# Patient Record
Sex: FEMALE | Race: WHITE
[De-identification: names, ages, dates, MRNs, and addresses within clinical notes are randomized per-mention and may not be internally consistent; named-entity substitution may affect disease eponyms.]

---

## 2017-01-27 ENCOUNTER — HOSPITAL ENCOUNTER (OUTPATIENT)
Dept: HOSPITAL 53 - M SFHCCLAY | Age: 82
End: 2017-01-27
Attending: FAMILY MEDICINE
Payer: MEDICARE

## 2017-01-27 DIAGNOSIS — E11.21: ICD-10-CM

## 2017-01-27 DIAGNOSIS — E78.4: Primary | ICD-10-CM

## 2017-01-27 LAB — EST. AVERAGE GLUCOSE BLD GHB EST-MCNC: 123 MG/DL (ref 60–110)

## 2017-07-06 ENCOUNTER — HOSPITAL ENCOUNTER (OUTPATIENT)
Dept: HOSPITAL 53 - M LAB REF | Age: 82
End: 2017-07-06
Attending: PHYSICIAN ASSISTANT
Payer: MEDICARE

## 2017-07-06 DIAGNOSIS — H65.01: Primary | ICD-10-CM

## 2017-08-10 ENCOUNTER — HOSPITAL ENCOUNTER (OUTPATIENT)
Dept: HOSPITAL 53 - M SFHCCLAY | Age: 82
End: 2017-08-10
Attending: FAMILY MEDICINE
Payer: MEDICARE

## 2017-08-10 DIAGNOSIS — R25.2: ICD-10-CM

## 2017-08-10 DIAGNOSIS — I10: ICD-10-CM

## 2017-08-10 DIAGNOSIS — E78.4: Primary | ICD-10-CM

## 2017-08-10 DIAGNOSIS — E11.21: ICD-10-CM

## 2017-08-10 LAB
ALBUMIN SERPL BCG-MCNC: 3.8 GM/DL (ref 3.2–5.2)
ALBUMIN/GLOB SERPL: 1.12 {RATIO} (ref 1–1.93)
ALP SERPL-CCNC: 32 U/L (ref 45–117)
ALT SERPL W P-5'-P-CCNC: 23 U/L (ref 12–78)
ANION GAP SERPL CALC-SCNC: 10 MEQ/L (ref 8–16)
AST SERPL-CCNC: 20 U/L (ref 15–37)
BILIRUB SERPL-MCNC: 0.3 MG/DL (ref 0.2–1)
BUN SERPL-MCNC: 46 MG/DL (ref 7–18)
CALCIUM SERPL-MCNC: 9.4 MG/DL (ref 8.8–10.2)
CHLORIDE SERPL-SCNC: 110 MEQ/L (ref 98–107)
CO2 SERPL-SCNC: 22 MEQ/L (ref 21–32)
CREAT SERPL-MCNC: 1.34 MG/DL (ref 0.55–1.02)
EST. AVERAGE GLUCOSE BLD GHB EST-MCNC: 137 MG/DL (ref 60–110)
GFR SERPL CREATININE-BSD FRML MDRD: 39.8 ML/MIN/{1.73_M2} (ref 32–?)
GLUCOSE SERPL-MCNC: 109 MG/DL (ref 83–110)
MAGNESIUM SERPL-MCNC: 2.2 MG/DL (ref 1.8–2.4)
POTASSIUM SERPL-SCNC: 5.4 MEQ/L (ref 3.5–5.1)
PROT SERPL-MCNC: 7.2 GM/DL (ref 6.4–8.2)
SODIUM SERPL-SCNC: 142 MEQ/L (ref 136–145)

## 2017-08-14 ENCOUNTER — HOSPITAL ENCOUNTER (OUTPATIENT)
Dept: HOSPITAL 53 - M SFHCCLAY | Age: 82
End: 2017-08-14
Attending: FAMILY MEDICINE
Payer: MEDICARE

## 2017-08-14 DIAGNOSIS — E78.4: ICD-10-CM

## 2017-08-14 DIAGNOSIS — R25.2: Primary | ICD-10-CM

## 2017-08-14 DIAGNOSIS — I10: ICD-10-CM

## 2017-12-06 ENCOUNTER — HOSPITAL ENCOUNTER (OUTPATIENT)
Dept: HOSPITAL 53 - M SFHCCLAY | Age: 82
End: 2017-12-06
Attending: FAMILY MEDICINE
Payer: MEDICARE

## 2017-12-06 DIAGNOSIS — E11.21: Primary | ICD-10-CM

## 2017-12-06 LAB
ALBUMIN SERPL BCG-MCNC: 4 GM/DL (ref 3.2–5.2)
ANION GAP SERPL CALC-SCNC: 10 MEQ/L (ref 8–16)
BUN SERPL-MCNC: 46 MG/DL (ref 7–18)
CALCIUM SERPL-MCNC: 10.1 MG/DL (ref 8.8–10.2)
CHLORIDE SERPL-SCNC: 104 MEQ/L (ref 98–107)
CO2 SERPL-SCNC: 28 MEQ/L (ref 21–32)
CREAT SERPL-MCNC: 1.63 MG/DL (ref 0.55–1.02)
GFR SERPL CREATININE-BSD FRML MDRD: 31.7 ML/MIN/{1.73_M2} (ref 32–?)
GLUCOSE SERPL-MCNC: 113 MG/DL (ref 83–110)
PHOSPHATE SERPL-MCNC: 3.6 MG/DL (ref 2.5–4.9)
POTASSIUM SERPL-SCNC: 4.7 MEQ/L (ref 3.5–5.1)
SODIUM SERPL-SCNC: 142 MEQ/L (ref 136–145)

## 2017-12-06 PROCEDURE — 80069 RENAL FUNCTION PANEL: CPT

## 2017-12-20 ENCOUNTER — HOSPITAL ENCOUNTER (OUTPATIENT)
Dept: HOSPITAL 53 - M SFHCCLAY | Age: 82
End: 2017-12-20
Attending: FAMILY MEDICINE
Payer: MEDICARE

## 2017-12-20 DIAGNOSIS — E11.21: Primary | ICD-10-CM

## 2017-12-20 LAB
ALBUMIN SERPL BCG-MCNC: 3.9 GM/DL (ref 3.2–5.2)
ANION GAP SERPL CALC-SCNC: 7 MEQ/L (ref 8–16)
BUN SERPL-MCNC: 50 MG/DL (ref 7–18)
CALCIUM SERPL-MCNC: 9.5 MG/DL (ref 8.8–10.2)
CHLORIDE SERPL-SCNC: 106 MEQ/L (ref 98–107)
CO2 SERPL-SCNC: 28 MEQ/L (ref 21–32)
CREAT SERPL-MCNC: 1.88 MG/DL (ref 0.55–1.02)
GFR SERPL CREATININE-BSD FRML MDRD: 26.9 ML/MIN/{1.73_M2} (ref 32–?)
GLUCOSE SERPL-MCNC: 114 MG/DL (ref 83–110)
PHOSPHATE SERPL-MCNC: 3.3 MG/DL (ref 2.5–4.9)
POTASSIUM SERPL-SCNC: 4.8 MEQ/L (ref 3.5–5.1)
SODIUM SERPL-SCNC: 141 MEQ/L (ref 136–145)

## 2018-01-12 ENCOUNTER — HOSPITAL ENCOUNTER (OUTPATIENT)
Dept: HOSPITAL 53 - M LAB REF | Age: 83
End: 2018-01-12
Attending: INTERNAL MEDICINE
Payer: MEDICARE

## 2018-01-12 DIAGNOSIS — N18.3: Primary | ICD-10-CM

## 2018-01-12 DIAGNOSIS — D63.1: ICD-10-CM

## 2018-01-12 LAB
FERRITIN: 17 NG/ML (ref 8–252)
IRON (FE): 53 UG/DL (ref 50–170)
IRON SATN MFR SERPL: 12.3 % (ref 13.2–45)
TOTAL IRON BINDING CAPACITY: 431 UG/DL (ref 250–450)

## 2018-01-12 PROCEDURE — 83550 IRON BINDING TEST: CPT

## 2018-01-30 ENCOUNTER — HOSPITAL ENCOUNTER (OUTPATIENT)
Dept: HOSPITAL 53 - M CLY | Age: 83
End: 2018-01-30
Attending: FAMILY MEDICINE
Payer: MEDICARE

## 2018-01-30 DIAGNOSIS — G95.89: ICD-10-CM

## 2018-01-30 DIAGNOSIS — M46.02: Primary | ICD-10-CM

## 2018-01-30 DIAGNOSIS — M41.9: ICD-10-CM

## 2018-01-30 PROCEDURE — 72050 X-RAY EXAM NECK SPINE 4/5VWS: CPT

## 2018-04-12 ENCOUNTER — HOSPITAL ENCOUNTER (OUTPATIENT)
Dept: HOSPITAL 53 - M LAB REF | Age: 83
End: 2018-04-12
Attending: INTERNAL MEDICINE
Payer: MEDICARE

## 2018-04-12 DIAGNOSIS — N18.3: Primary | ICD-10-CM

## 2018-04-12 DIAGNOSIS — R80.9: ICD-10-CM

## 2018-04-12 PROCEDURE — 84156 ASSAY OF PROTEIN URINE: CPT

## 2018-04-13 LAB — TOTAL PROTEIN,RANDOM URINE: 34 MG/DL (ref 0–12)

## 2018-04-18 ENCOUNTER — HOSPITAL ENCOUNTER (OUTPATIENT)
Dept: HOSPITAL 53 - M SFHCCLAY | Age: 83
End: 2018-04-18
Attending: FAMILY MEDICINE
Payer: MEDICARE

## 2018-04-18 DIAGNOSIS — N18.3: ICD-10-CM

## 2018-04-18 DIAGNOSIS — E11.21: Primary | ICD-10-CM

## 2018-04-18 DIAGNOSIS — I12.9: ICD-10-CM

## 2018-04-18 LAB
ANION GAP: 8 MEQ/L (ref 8–16)
BLOOD UREA NITROGEN: 43 MG/DL (ref 7–18)
CALCIUM LEVEL: 10.1 MG/DL (ref 8.8–10.2)
CARBON DIOXIDE LEVEL: 25 MEQ/L (ref 21–32)
CHLORIDE LEVEL: 104 MEQ/L (ref 98–107)
CREATININE FOR GFR: 1.52 MG/DL (ref 0.55–1.3)
EST. AVERAGE GLUCOSE BLD GHB EST-MCNC: 154 MG/DL (ref 60–110)
GFR SERPL CREATININE-BSD FRML MDRD: 34.4 ML/MIN/{1.73_M2} (ref 32–?)
GLUCOSE, FASTING: 197 MG/DL (ref 70–100)
POTASSIUM SERUM: 4.8 MEQ/L (ref 3.5–5.1)
SODIUM LEVEL: 137 MEQ/L (ref 136–145)

## 2018-04-18 PROCEDURE — 83036 HEMOGLOBIN GLYCOSYLATED A1C: CPT

## 2018-10-10 ENCOUNTER — HOSPITAL ENCOUNTER (OUTPATIENT)
Dept: HOSPITAL 53 - M SFHCCLAY | Age: 83
End: 2018-10-10
Attending: FAMILY MEDICINE
Payer: MEDICARE

## 2018-10-10 DIAGNOSIS — N18.3: ICD-10-CM

## 2018-10-10 DIAGNOSIS — E11.22: Primary | ICD-10-CM

## 2018-10-10 LAB
ANION GAP: 7 MEQ/L (ref 8–16)
BLOOD UREA NITROGEN: 45 MG/DL (ref 7–18)
CALCIUM LEVEL: 9.5 MG/DL (ref 8.8–10.2)
CARBON DIOXIDE LEVEL: 28 MEQ/L (ref 21–32)
CHLORIDE LEVEL: 104 MEQ/L (ref 98–107)
CREATININE FOR GFR: 1.57 MG/DL (ref 0.55–1.3)
EST. AVERAGE GLUCOSE BLD GHB EST-MCNC: 157 MG/DL (ref 60–110)
GFR SERPL CREATININE-BSD FRML MDRD: 33.1 ML/MIN/{1.73_M2} (ref 32–?)
GLUCOSE, FASTING: 142 MG/DL (ref 70–100)
POTASSIUM SERUM: 4.7 MEQ/L (ref 3.5–5.1)
SODIUM LEVEL: 139 MEQ/L (ref 136–145)

## 2018-10-10 PROCEDURE — 83036 HEMOGLOBIN GLYCOSYLATED A1C: CPT

## 2019-01-13 ENCOUNTER — HOSPITAL ENCOUNTER (EMERGENCY)
Dept: HOSPITAL 53 - M ED | Age: 84
Discharge: HOME | End: 2019-01-13
Payer: MEDICARE

## 2019-01-13 VITALS — BODY MASS INDEX: 24.68 KG/M2 | WEIGHT: 139.29 LBS | HEIGHT: 63 IN

## 2019-01-13 VITALS — DIASTOLIC BLOOD PRESSURE: 73 MMHG | SYSTOLIC BLOOD PRESSURE: 152 MMHG

## 2019-01-13 DIAGNOSIS — S42.202A: Primary | ICD-10-CM
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
## 2019-01-13 DIAGNOSIS — Z79.899: ICD-10-CM
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
## 2019-01-13 DIAGNOSIS — Z88.8: ICD-10-CM

## 2019-01-13 DIAGNOSIS — I10: ICD-10-CM

## 2019-01-13 DIAGNOSIS — E11.9: ICD-10-CM

## 2019-01-14 NOTE — REP
Left humerus:  Two views.

 

History:  Pain after a fall.

 

Findings:  Two views of the left humerus demonstrate an impacted and somewhat

comminuted fracture of the surgical neck of the left proximal humerus.  There is

diffuse osteopenia.  There are surgical clips in the left axillary soft tissues.

No scapular or clavicular fracture.

 

Impression:

 

Impacted and somewhat comminuted fracture of the surgical neck of the left

proximal humerus.

 

 

Electronically Signed by

Eulogio Hood MD 01/14/2019 08:13 A

## 2019-02-01 ENCOUNTER — HOSPITAL ENCOUNTER (OUTPATIENT)
Dept: HOSPITAL 53 - M CLY | Age: 84
End: 2019-02-01
Attending: FAMILY MEDICINE
Payer: MEDICARE

## 2019-02-01 DIAGNOSIS — S42.202D: Primary | ICD-10-CM

## 2019-02-01 DIAGNOSIS — R07.81: ICD-10-CM

## 2019-02-01 PROCEDURE — 71100 X-RAY EXAM RIBS UNI 2 VIEWS: CPT

## 2019-02-01 NOTE — REP
Clinical:  Left sided pleuritic chest pain

 

Technique:  Four views of the left hemithorax.

 

Findings:

Four views of the left hemithorax demonstrates no obvious acute rib fracture or

pathology. Left axillary node dissection noted.  No pneumothorax.  Nonacute

fracture of the humeral head at the surgical neck unchanged compared to

01/13/2019.

 

Impression:

No acute rib fracture.

Humeral head fracture at the surgical neck unchanged compared to 01/13/2019.

 

 

Electronically Signed by

Demetrius Alfaro MD 02/01/2019 02:22 P

## 2019-02-09 ENCOUNTER — HOSPITAL ENCOUNTER (EMERGENCY)
Dept: HOSPITAL 53 - M ED | Age: 84
LOS: 1 days | Discharge: HOME | End: 2019-02-10
Payer: MEDICARE

## 2019-02-09 VITALS — BODY MASS INDEX: 25.08 KG/M2 | WEIGHT: 136.29 LBS | HEIGHT: 62 IN

## 2019-02-09 VITALS — SYSTOLIC BLOOD PRESSURE: 137 MMHG | DIASTOLIC BLOOD PRESSURE: 78 MMHG

## 2019-02-09 DIAGNOSIS — N18.3: ICD-10-CM

## 2019-02-09 DIAGNOSIS — Z88.8: ICD-10-CM

## 2019-02-09 DIAGNOSIS — M19.142: Primary | ICD-10-CM

## 2019-02-09 DIAGNOSIS — Z79.899: ICD-10-CM

## 2019-02-09 DIAGNOSIS — N39.0: ICD-10-CM

## 2019-02-09 DIAGNOSIS — Z85.3: ICD-10-CM

## 2019-02-09 DIAGNOSIS — I12.9: ICD-10-CM

## 2019-02-09 DIAGNOSIS — D64.9: ICD-10-CM

## 2019-02-09 DIAGNOSIS — E11.29: ICD-10-CM

## 2019-02-09 DIAGNOSIS — M54.9: ICD-10-CM

## 2019-02-09 LAB
ALBUMIN SERPL BCG-MCNC: 3.4 GM/DL (ref 3.2–5.2)
ALT SERPL W P-5'-P-CCNC: 19 U/L (ref 12–78)
APPEARANCE UR: CLEAR
BACTERIA UR QL AUTO: (no result)
BASOPHILS # BLD AUTO: 0 10^3/UL (ref 0–0.2)
BASOPHILS NFR BLD AUTO: 0.3 % (ref 0–1)
BILIRUB CONJ SERPL-MCNC: 0.1 MG/DL (ref 0–0.2)
BILIRUB SERPL-MCNC: 0.2 MG/DL (ref 0.2–1)
BILIRUB UR QL STRIP.AUTO: NEGATIVE
BUN SERPL-MCNC: 38 MG/DL (ref 7–18)
CALCIUM SERPL-MCNC: 10 MG/DL (ref 8.8–10.2)
CHLORIDE SERPL-SCNC: 100 MEQ/L (ref 98–107)
CO2 SERPL-SCNC: 25 MEQ/L (ref 21–32)
CREAT SERPL-MCNC: 1.25 MG/DL (ref 0.55–1.3)
CRP SERPL-MCNC: 7.45 MG/DL (ref 0–0.3)
EOSINOPHIL # BLD AUTO: 0.1 10^3/UL (ref 0–0.5)
EOSINOPHIL NFR BLD AUTO: 0.8 % (ref 0–3)
GFR SERPL CREATININE-BSD FRML MDRD: 43 ML/MIN/{1.73_M2} (ref 32–?)
GLUCOSE SERPL-MCNC: 195 MG/DL (ref 70–100)
GLUCOSE UR QL STRIP.AUTO: NEGATIVE MG/DL
HCT VFR BLD AUTO: 32.2 % (ref 36–47)
HGB BLD-MCNC: 10.8 G/DL (ref 12–15.5)
HGB UR QL STRIP.AUTO: NEGATIVE
KETONES UR QL STRIP.AUTO: NEGATIVE MG/DL
LEUKOCYTE ESTERASE UR QL STRIP.AUTO: (no result)
LYMPHOCYTES # BLD AUTO: 2.5 10^3/UL (ref 1.5–4.5)
LYMPHOCYTES NFR BLD AUTO: 20.9 % (ref 24–44)
MCH RBC QN AUTO: 31 PG (ref 27–33)
MCHC RBC AUTO-ENTMCNC: 33.5 G/DL (ref 32–36.5)
MCV RBC AUTO: 92.5 FL (ref 80–96)
MONOCYTES # BLD AUTO: 1.3 10^3/UL (ref 0–0.8)
MONOCYTES NFR BLD AUTO: 10.9 % (ref 0–5)
NEUTROPHILS # BLD AUTO: 8 10^3/UL (ref 1.8–7.7)
NEUTROPHILS NFR BLD AUTO: 66.8 % (ref 36–66)
NITRITE UR QL STRIP.AUTO: NEGATIVE
PH UR STRIP.AUTO: 5 UNITS (ref 5–9)
PLATELET # BLD AUTO: 564 10^3/UL (ref 150–450)
POTASSIUM SERPL-SCNC: 4.4 MEQ/L (ref 3.5–5.1)
PROT SERPL-MCNC: 8 GM/DL (ref 6.4–8.2)
PROT UR QL STRIP.AUTO: NEGATIVE MG/DL
RBC # BLD AUTO: 3.48 10^6/UL (ref 4–5.4)
RBC # UR AUTO: 2 /HPF (ref 0–3)
SODIUM SERPL-SCNC: 132 MEQ/L (ref 136–145)
SP GR UR STRIP.AUTO: 1.02 (ref 1–1.03)
SQUAMOUS #/AREA URNS AUTO: 0 /HPF (ref 0–6)
UROBILINOGEN UR QL STRIP.AUTO: 0.2 MG/DL (ref 0–2)
WBC # BLD AUTO: 11.9 10^3/UL (ref 4–10)
WBC #/AREA URNS AUTO: 2 /HPF (ref 0–3)

## 2019-02-09 NOTE — REPVR
EXAM: 

 US Left Duplex Upper Extremity Veins, Limited 



EXAM DATE/TIME: 

 2/9/2019 8:42 PM 



CLINICAL HISTORY: 

 89 years old, female; Signs and symptoms; Edema, localized; Upper extremity, 

left; Additional info: L hand swelling, recent l shoulder FX 



TECHNIQUE: 

 Real-time Duplex ultrasound of the Left Upper Extremity with 2-D gray scale, 

color Doppler flow and spectral waveform analysis. Limited exam focused on the 

left upper extremity veins. 



COMPARISON: 

 No relevant prior studies available. 



FINDINGS: 

 Left deep veins: Internal jugular, subclavian, axillary and brachial veins 

patent without thrombus. Normal compressibility, augmentation response and/or 

Doppler waveforms. 

 Left superficial veins: Visualized cephalic and basilic veins patent without 

thrombus.  



 Soft tissues: Unremarkable. 



IMPRESSION: 

No sonographic evidence of deep vein thrombosis. 



Electronically signed by: Geronimo Palencia On 02/09/2019  21:28:46 PM

## 2019-02-11 NOTE — REP
CHEST, TWO VIEWS:

 

Two views of the chest are performed. There is mild scattered fibrotic change

bilaterally. There is no acute infiltrate. The heart is not enlarged. There is

calcification and tortuosity of the thoracic aorta. The mediastinal silhouette is

otherwise unremarkable. Metallic clips are seen in the left axillary region.

There are mild degenerative changes of the spine.

 

IMPRESSION:

 

Chronic changes without evidence of acute infiltrate.

 

 

Electronically Signed by

Lior Dominguez MD 02/11/2019 10:55 P

## 2019-02-11 NOTE — REP
LEFT HAND:

 

Four views left hand performed.

 

No acute fracture or dislocation is seen. There is narrowing and spurring as well

as subchondral sclerosis of the joint between the trapezium and base of 1st

metacarpal. There is moderate narrowing of 2nd metacarpophalangeal joint. There

is mild to moderate narrowing diffuse of the interphalangeal joints.

 

IMPRESSION:

 

Diffuse degenerative changes. No fracture or osseous destruction.

 

 

Electronically Signed by

Lior Dominguez MD 02/11/2019 10:55 P

## 2019-02-21 ENCOUNTER — HOSPITAL ENCOUNTER (OUTPATIENT)
Dept: HOSPITAL 53 - M SFHCCLAY | Age: 84
End: 2019-02-21
Attending: FAMILY MEDICINE
Payer: MEDICARE

## 2019-02-21 DIAGNOSIS — E11.21: Primary | ICD-10-CM

## 2019-02-21 LAB — EST. AVERAGE GLUCOSE BLD GHB EST-MCNC: 189 MG/DL (ref 60–110)

## 2019-02-21 PROCEDURE — 83036 HEMOGLOBIN GLYCOSYLATED A1C: CPT
